# Patient Record
(demographics unavailable — no encounter records)

---

## 2025-05-15 NOTE — PHYSICAL EXAM
[de-identified] : L hand: +thumb CMC swelling +thumb CMC tenderness Decreased thumb ROM     Todays Xrays PA/Lateral/Oblique of the hand/thumb show thumb MC base fracture displaced

## 2025-05-15 NOTE — HISTORY OF PRESENT ILLNESS
[8] : 8 [6] : 6 [Dull/Aching] : dull/aching [de-identified] : L thumb injury 2 weeks ago Lifting table at that time  He has pain and swleling  [FreeTextEntry1] : L thumb

## 2025-05-15 NOTE — ASSESSMENT
[FreeTextEntry1] :  This is an acute complicated injury with the need for CRPP   I rec CRPP L thumb MC fracture  He does not have insurance and needs surgery urgently I gave him options of [facility] or self pay with me next week  He will try to see [facility]ELIC[facility] NOVAK ASAP  Return prn

## 2025-05-15 NOTE — REASON FOR VISIT
[FreeTextEntry2] : 05/15/2025: 21 year old female here today for: new patient L thumb pain after picking up a couch he injured his thumb 2 weeks ago, [facility], he had x-rays and placed in a brace

## 2025-06-09 NOTE — REASON FOR VISIT
[FreeTextEntry2] :  06/09/2025:  21 year old female here today for: PO#1 L hand pain and Closed displaced fracture of base of first metacarpal bone

## 2025-06-09 NOTE — PHYSICAL EXAM
[de-identified] : L hand: min swelling  Pins in place Stiffness  min tenderness No signs of infection    Todays Xrays PA/Lateral/Oblique of the hand/thumb show healing thumb MC base fracture displaced; pins in place

## 2025-06-09 NOTE — HISTORY OF PRESENT ILLNESS
[6] : 6 [4] : 4 [Dull/Aching] : dull/aching [] : Post Surgical Visit: yes [de-identified] : CRPP L thumb MC fracture 5/21/2025 He is doing better [FreeTextEntry1] : L hand/ michael [de-identified] : 5/21/25 [de-identified] : CRPP L thumb MC fracture

## 2025-06-09 NOTE — ASSESSMENT
[FreeTextEntry1] : I recommend the patient take over the counter medication such as Advil/Motrin/Aleve or Tylenol for pain and inflammation Return in 2 weeks- repeat xrays

## 2025-06-23 NOTE — REASON FOR VISIT
[FreeTextEntry2] :  06/23/2025:  21 year old female here today for: PO#2 L hand pain and Closed displaced fracture of base of first metacarpal bone

## 2025-06-23 NOTE — PHYSICAL EXAM
[de-identified] : L hand: min swelling  Pins in place Stiffness  No tenderness No signs of infection    Todays Xrays PA/Lateral/Oblique of the hand/thumb show healing thumb MC base fracture healed; pins in place

## 2025-06-23 NOTE — HISTORY OF PRESENT ILLNESS
[6] : 6 [4] : 4 [Dull/Aching] : dull/aching [] : Post Surgical Visit: yes [de-identified] : CRPP L thumb MC fracture 5/21/2025 He is doing better [FreeTextEntry1] : L hand/ michael [de-identified] : 5/21/25 [de-identified] : CRPP L thumb MC fracture